# Patient Record
Sex: FEMALE | Race: OTHER
[De-identification: names, ages, dates, MRNs, and addresses within clinical notes are randomized per-mention and may not be internally consistent; named-entity substitution may affect disease eponyms.]

---

## 2023-01-13 ENCOUNTER — HOSPITAL ENCOUNTER (EMERGENCY)
Dept: HOSPITAL 54 - ER | Age: 18
LOS: 1 days | Discharge: HOME | End: 2023-01-14
Payer: MEDICAID

## 2023-01-13 VITALS — HEIGHT: 68 IN | WEIGHT: 212 LBS | BODY MASS INDEX: 32.13 KG/M2

## 2023-01-13 VITALS — DIASTOLIC BLOOD PRESSURE: 70 MMHG | SYSTOLIC BLOOD PRESSURE: 116 MMHG

## 2023-01-13 DIAGNOSIS — Z79.899: ICD-10-CM

## 2023-01-13 DIAGNOSIS — D64.9: ICD-10-CM

## 2023-01-13 DIAGNOSIS — B37.49: ICD-10-CM

## 2023-01-13 DIAGNOSIS — N93.8: Primary | ICD-10-CM

## 2023-01-13 LAB
ALBUMIN SERPL BCP-MCNC: 4 G/DL (ref 3.4–5)
ALP SERPL-CCNC: 107 U/L (ref 46–116)
ALT SERPL W P-5'-P-CCNC: 11 U/L (ref 12–78)
AST SERPL W P-5'-P-CCNC: 12 U/L (ref 15–37)
BASOPHILS # BLD AUTO: 0 K/UL (ref 0–0.2)
BASOPHILS NFR BLD AUTO: 0.2 % (ref 0–2)
BILIRUB DIRECT SERPL-MCNC: 0.1 MG/DL (ref 0–0.2)
BILIRUB SERPL-MCNC: 0.3 MG/DL (ref 0.2–1)
BILIRUB UR QL STRIP: NEGATIVE
BUN SERPL-MCNC: 10 MG/DL (ref 7–18)
CALCIUM SERPL-MCNC: 9.4 MG/DL (ref 8.5–10.1)
CHLORIDE SERPL-SCNC: 101 MMOL/L (ref 98–107)
CO2 SERPL-SCNC: 28 MMOL/L (ref 21–32)
COLOR UR: YELLOW
CREAT SERPL-MCNC: 0.9 MG/DL (ref 0.6–1.3)
DEPRECATED SQUAMOUS URNS QL MICRO: (no result) /HPF
EOSINOPHIL NFR BLD AUTO: 0.2 % (ref 0–6)
GLUCOSE SERPL-MCNC: 97 MG/DL (ref 74–106)
GLUCOSE UR STRIP-MCNC: NEGATIVE MG/DL
HCT VFR BLD AUTO: 27 % (ref 33–45)
HGB BLD-MCNC: 8 G/DL (ref 11.5–14.8)
LEUKOCYTE ESTERASE UR QL STRIP: (no result)
LYMPHOCYTES NFR BLD AUTO: 16.1 % (ref 20–44)
LYMPHOCYTES NFR BLD AUTO: 2.5 K/UL (ref 0.8–4.8)
LYMPHOCYTES NFR BLD MANUAL: 15 % (ref 16–48)
MCHC RBC AUTO-ENTMCNC: 29 G/DL (ref 31–36)
MCV RBC AUTO: 61 FL (ref 82–100)
MONOCYTES NFR BLD AUTO: 0.8 K/UL (ref 0.1–1.3)
MONOCYTES NFR BLD AUTO: 5.1 % (ref 2–12)
MONOCYTES NFR BLD MANUAL: 4 % (ref 0–11)
NEUTROPHILS # BLD AUTO: 12.1 K/UL (ref 1.8–8.9)
NEUTROPHILS NFR BLD AUTO: 78.4 % (ref 43–81)
NEUTS BAND NFR BLD MANUAL: 1 % (ref 0–5)
NEUTS SEG NFR BLD MANUAL: 80 % (ref 42–76)
NITRITE UR QL STRIP: NEGATIVE
PH UR STRIP: 7.5 [PH] (ref 5–8)
PLATELET # BLD AUTO: 626 K/UL (ref 150–450)
POTASSIUM SERPL-SCNC: 3.6 MMOL/L (ref 3.5–5.1)
PROT SERPL-MCNC: 8.9 G/DL (ref 6.4–8.2)
PROT UR QL STRIP: (no result) MG/DL
RBC # BLD AUTO: 4.53 MIL/UL (ref 4–5.2)
RBC #/AREA URNS HPF: (no result) /HPF (ref 0–2)
SODIUM SERPL-SCNC: 136 MMOL/L (ref 136–145)
UROBILINOGEN UR STRIP-MCNC: 0.2 EU/DL
WBC #/AREA URNS HPF: (no result) /HPF
WBC NRBC COR # BLD AUTO: 15.4 K/UL (ref 4.3–11)

## 2023-01-13 NOTE — NUR
RECEIVED REPORT FROM CATHERINE DOLAN. PATIENT CAME WITH CC OF VAGINAL BLEEDING AND 
BURNING IN ANAL AREA. PATIENT IS [id number]. ABLE TO MAKE NEEDS KNOWN. AS PER 
PATIENT, SHE CHANGED 6X HER TAMPONS AND SHE HAD THIS VAGINAL BLEEDING SINCE 
NOVEMBER. PATIENT IS ATTACHED TO MONITOR. VITALS CHECKED.

## 2023-01-13 NOTE — NUR
RECEIVED PT 17 yrs female came  from home by brandon c/o vaginale bleeding for 
3 month and rectale pain for 1 day